# Patient Record
Sex: FEMALE | Race: WHITE | NOT HISPANIC OR LATINO
[De-identification: names, ages, dates, MRNs, and addresses within clinical notes are randomized per-mention and may not be internally consistent; named-entity substitution may affect disease eponyms.]

---

## 2018-02-14 ENCOUNTER — FORM ENCOUNTER (OUTPATIENT)
Age: 48
End: 2018-02-14

## 2018-02-14 ENCOUNTER — OTHER (OUTPATIENT)
Age: 48
End: 2018-02-14

## 2018-02-15 ENCOUNTER — APPOINTMENT (OUTPATIENT)
Dept: MAMMOGRAPHY | Facility: CLINIC | Age: 48
End: 2018-02-15
Payer: COMMERCIAL

## 2018-02-15 ENCOUNTER — APPOINTMENT (OUTPATIENT)
Dept: ULTRASOUND IMAGING | Facility: CLINIC | Age: 48
End: 2018-02-15
Payer: COMMERCIAL

## 2018-02-15 ENCOUNTER — OUTPATIENT (OUTPATIENT)
Dept: OUTPATIENT SERVICES | Facility: HOSPITAL | Age: 48
LOS: 1 days | End: 2018-02-15
Payer: COMMERCIAL

## 2018-02-15 DIAGNOSIS — Z01.419 ENCOUNTER FOR GYNECOLOGICAL EXAMINATION (GENERAL) (ROUTINE) WITHOUT ABNORMAL FINDINGS: ICD-10-CM

## 2018-02-15 DIAGNOSIS — Z00.8 ENCOUNTER FOR OTHER GENERAL EXAMINATION: ICD-10-CM

## 2018-02-15 PROCEDURE — 76641 ULTRASOUND BREAST COMPLETE: CPT

## 2018-02-15 PROCEDURE — 76641 ULTRASOUND BREAST COMPLETE: CPT | Mod: 26,50

## 2018-02-15 PROCEDURE — 77063 BREAST TOMOSYNTHESIS BI: CPT | Mod: 26

## 2018-02-15 PROCEDURE — 77067 SCR MAMMO BI INCL CAD: CPT | Mod: 26

## 2018-02-15 PROCEDURE — 77067 SCR MAMMO BI INCL CAD: CPT

## 2018-02-15 PROCEDURE — 77063 BREAST TOMOSYNTHESIS BI: CPT

## 2019-02-07 ENCOUNTER — TRANSCRIPTION ENCOUNTER (OUTPATIENT)
Age: 49
End: 2019-02-07

## 2020-11-15 ENCOUNTER — EMERGENCY (EMERGENCY)
Facility: HOSPITAL | Age: 50
LOS: 1 days | Discharge: HOME | End: 2020-11-15
Attending: EMERGENCY MEDICINE
Payer: COMMERCIAL

## 2020-11-15 VITALS
OXYGEN SATURATION: 94 % | HEART RATE: 106 BPM | SYSTOLIC BLOOD PRESSURE: 121 MMHG | DIASTOLIC BLOOD PRESSURE: 69 MMHG | RESPIRATION RATE: 20 BRPM | TEMPERATURE: 100 F

## 2020-11-15 VITALS — RESPIRATION RATE: 26 BRPM | HEART RATE: 114 BPM | HEIGHT: 66 IN | WEIGHT: 130.07 LBS | TEMPERATURE: 100 F

## 2020-11-15 LAB
ANION GAP SERPL CALC-SCNC: 14 MMOL/L — SIGNIFICANT CHANGE UP (ref 5–17)
APPEARANCE UR: ABNORMAL
BACTERIA # UR AUTO: ABNORMAL
BASOPHILS # BLD AUTO: 0.04 K/UL — SIGNIFICANT CHANGE UP (ref 0–0.2)
BASOPHILS NFR BLD AUTO: 0.3 % — SIGNIFICANT CHANGE UP (ref 0–2)
BILIRUB UR-MCNC: NEGATIVE — SIGNIFICANT CHANGE UP
BUN SERPL-MCNC: 20 MG/DL — SIGNIFICANT CHANGE UP (ref 8–20)
CALCIUM SERPL-MCNC: 8.8 MG/DL — SIGNIFICANT CHANGE UP (ref 8.6–10.2)
CHLORIDE SERPL-SCNC: 103 MMOL/L — SIGNIFICANT CHANGE UP (ref 98–107)
CO2 SERPL-SCNC: 24 MMOL/L — SIGNIFICANT CHANGE UP (ref 22–29)
COLOR SPEC: YELLOW — SIGNIFICANT CHANGE UP
CREAT SERPL-MCNC: 0.8 MG/DL — SIGNIFICANT CHANGE UP (ref 0.5–1.3)
DIFF PNL FLD: ABNORMAL
EOSINOPHIL # BLD AUTO: 0.22 K/UL — SIGNIFICANT CHANGE UP (ref 0–0.5)
EOSINOPHIL NFR BLD AUTO: 1.8 % — SIGNIFICANT CHANGE UP (ref 0–6)
EPI CELLS # UR: SIGNIFICANT CHANGE UP
GLUCOSE SERPL-MCNC: 100 MG/DL — HIGH (ref 70–99)
GLUCOSE UR QL: NEGATIVE — SIGNIFICANT CHANGE UP
HCG SERPL-ACNC: <4 MIU/ML — SIGNIFICANT CHANGE UP
HCT VFR BLD CALC: 38.5 % — SIGNIFICANT CHANGE UP (ref 34.5–45)
HGB BLD-MCNC: 13.1 G/DL — SIGNIFICANT CHANGE UP (ref 11.5–15.5)
IMM GRANULOCYTES NFR BLD AUTO: 0.2 % — SIGNIFICANT CHANGE UP (ref 0–1.5)
KETONES UR-MCNC: NEGATIVE — SIGNIFICANT CHANGE UP
LEUKOCYTE ESTERASE UR-ACNC: ABNORMAL
LYMPHOCYTES # BLD AUTO: 1.09 K/UL — SIGNIFICANT CHANGE UP (ref 1–3.3)
LYMPHOCYTES # BLD AUTO: 8.9 % — LOW (ref 13–44)
MCHC RBC-ENTMCNC: 32.3 PG — SIGNIFICANT CHANGE UP (ref 27–34)
MCHC RBC-ENTMCNC: 34 GM/DL — SIGNIFICANT CHANGE UP (ref 32–36)
MCV RBC AUTO: 94.8 FL — SIGNIFICANT CHANGE UP (ref 80–100)
MONOCYTES # BLD AUTO: 0.64 K/UL — SIGNIFICANT CHANGE UP (ref 0–0.9)
MONOCYTES NFR BLD AUTO: 5.2 % — SIGNIFICANT CHANGE UP (ref 2–14)
NEUTROPHILS # BLD AUTO: 10.27 K/UL — HIGH (ref 1.8–7.4)
NEUTROPHILS NFR BLD AUTO: 83.6 % — HIGH (ref 43–77)
NITRITE UR-MCNC: POSITIVE
PH UR: 6 — SIGNIFICANT CHANGE UP (ref 5–8)
PLATELET # BLD AUTO: 347 K/UL — SIGNIFICANT CHANGE UP (ref 150–400)
POTASSIUM SERPL-MCNC: 4.4 MMOL/L — SIGNIFICANT CHANGE UP (ref 3.5–5.3)
POTASSIUM SERPL-SCNC: 4.4 MMOL/L — SIGNIFICANT CHANGE UP (ref 3.5–5.3)
PROT UR-MCNC: 30 MG/DL
RBC # BLD: 4.06 M/UL — SIGNIFICANT CHANGE UP (ref 3.8–5.2)
RBC # FLD: 13.2 % — SIGNIFICANT CHANGE UP (ref 10.3–14.5)
RBC CASTS # UR COMP ASSIST: ABNORMAL /HPF (ref 0–4)
SODIUM SERPL-SCNC: 141 MMOL/L — SIGNIFICANT CHANGE UP (ref 135–145)
SP GR SPEC: 1.01 — SIGNIFICANT CHANGE UP (ref 1.01–1.02)
UROBILINOGEN FLD QL: NEGATIVE — SIGNIFICANT CHANGE UP
WBC # BLD: 12.29 K/UL — HIGH (ref 3.8–10.5)
WBC # FLD AUTO: 12.29 K/UL — HIGH (ref 3.8–10.5)
WBC UR QL: ABNORMAL

## 2020-11-15 PROCEDURE — 84702 CHORIONIC GONADOTROPIN TEST: CPT

## 2020-11-15 PROCEDURE — 99285 EMERGENCY DEPT VISIT HI MDM: CPT

## 2020-11-15 PROCEDURE — 85025 COMPLETE CBC W/AUTO DIFF WBC: CPT

## 2020-11-15 PROCEDURE — 87186 SC STD MICRODIL/AGAR DIL: CPT

## 2020-11-15 PROCEDURE — 81001 URINALYSIS AUTO W/SCOPE: CPT

## 2020-11-15 PROCEDURE — 96374 THER/PROPH/DIAG INJ IV PUSH: CPT

## 2020-11-15 PROCEDURE — 96376 TX/PRO/DX INJ SAME DRUG ADON: CPT

## 2020-11-15 PROCEDURE — 74176 CT ABD & PELVIS W/O CONTRAST: CPT

## 2020-11-15 PROCEDURE — 74176 CT ABD & PELVIS W/O CONTRAST: CPT | Mod: 26

## 2020-11-15 PROCEDURE — 87086 URINE CULTURE/COLONY COUNT: CPT

## 2020-11-15 PROCEDURE — 96375 TX/PRO/DX INJ NEW DRUG ADDON: CPT

## 2020-11-15 PROCEDURE — 80048 BASIC METABOLIC PNL TOTAL CA: CPT

## 2020-11-15 PROCEDURE — 36415 COLL VENOUS BLD VENIPUNCTURE: CPT

## 2020-11-15 PROCEDURE — 99284 EMERGENCY DEPT VISIT MOD MDM: CPT | Mod: 25

## 2020-11-15 RX ORDER — CEPHALEXIN 500 MG
2 CAPSULE ORAL
Qty: 40 | Refills: 0
Start: 2020-11-15 | End: 2020-11-24

## 2020-11-15 RX ORDER — ONDANSETRON 8 MG/1
1 TABLET, FILM COATED ORAL
Qty: 15 | Refills: 0
Start: 2020-11-15 | End: 2020-11-19

## 2020-11-15 RX ORDER — ONDANSETRON 8 MG/1
8 TABLET, FILM COATED ORAL ONCE
Refills: 0 | Status: COMPLETED | OUTPATIENT
Start: 2020-11-15 | End: 2020-11-15

## 2020-11-15 RX ORDER — KETOROLAC TROMETHAMINE 30 MG/ML
30 SYRINGE (ML) INJECTION ONCE
Refills: 0 | Status: DISCONTINUED | OUTPATIENT
Start: 2020-11-15 | End: 2020-11-15

## 2020-11-15 RX ORDER — HYDROMORPHONE HYDROCHLORIDE 2 MG/ML
0.5 INJECTION INTRAMUSCULAR; INTRAVENOUS; SUBCUTANEOUS ONCE
Refills: 0 | Status: DISCONTINUED | OUTPATIENT
Start: 2020-11-15 | End: 2020-11-15

## 2020-11-15 RX ORDER — HYDROMORPHONE HYDROCHLORIDE 2 MG/ML
1 INJECTION INTRAMUSCULAR; INTRAVENOUS; SUBCUTANEOUS ONCE
Refills: 0 | Status: DISCONTINUED | OUTPATIENT
Start: 2020-11-15 | End: 2020-11-15

## 2020-11-15 RX ORDER — CEPHALEXIN 500 MG
500 CAPSULE ORAL ONCE
Refills: 0 | Status: COMPLETED | OUTPATIENT
Start: 2020-11-15 | End: 2020-11-15

## 2020-11-15 RX ORDER — OXYCODONE AND ACETAMINOPHEN 5; 325 MG/1; MG/1
1 TABLET ORAL ONCE
Refills: 0 | Status: DISCONTINUED | OUTPATIENT
Start: 2020-11-15 | End: 2020-11-15

## 2020-11-15 RX ORDER — ONDANSETRON 8 MG/1
4 TABLET, FILM COATED ORAL ONCE
Refills: 0 | Status: COMPLETED | OUTPATIENT
Start: 2020-11-15 | End: 2020-11-15

## 2020-11-15 RX ADMIN — OXYCODONE AND ACETAMINOPHEN 1 TABLET(S): 5; 325 TABLET ORAL at 03:03

## 2020-11-15 RX ADMIN — ONDANSETRON 8 MILLIGRAM(S): 8 TABLET, FILM COATED ORAL at 01:11

## 2020-11-15 RX ADMIN — Medication 30 MILLIGRAM(S): at 01:11

## 2020-11-15 RX ADMIN — HYDROMORPHONE HYDROCHLORIDE 1 MILLIGRAM(S): 2 INJECTION INTRAMUSCULAR; INTRAVENOUS; SUBCUTANEOUS at 01:11

## 2020-11-15 RX ADMIN — HYDROMORPHONE HYDROCHLORIDE 0.5 MILLIGRAM(S): 2 INJECTION INTRAMUSCULAR; INTRAVENOUS; SUBCUTANEOUS at 03:03

## 2020-11-15 RX ADMIN — Medication 500 MILLIGRAM(S): at 03:03

## 2020-11-15 RX ADMIN — ONDANSETRON 4 MILLIGRAM(S): 8 TABLET, FILM COATED ORAL at 03:03

## 2020-11-15 NOTE — ED PROVIDER NOTE - OBJECTIVE STATEMENT
51 y/o female presents to ED c/o flank pain. Patient reports sudden onset of right flank pain, started at 8pm last night. Patient was unable to sleep, so she presented to the ED.    denies fever. denies HA or neck pain. no chest pain or sob. no abd pain. no n/v/d. no urinary f/u/d. no back pain. no motor or sensory deficits. denies illicit drug use. no recent travel. no rash. no other acute issues symptoms or concerns

## 2020-11-15 NOTE — ED PROVIDER NOTE - PATIENT PORTAL LINK FT
You can access the FollowMyHealth Patient Portal offered by [facility] by registering at the following website: [email or web address]. By joining [facility]’s FollowMyHealth portal, you will also be able to view your health information using other applications (apps) compatible with our system.

## 2020-11-15 NOTE — ED ADULT TRIAGE NOTE - CHIEF COMPLAINT QUOTE
patient c/o sudden onset right flank pain that began at 8PM tonight. patient attempted to go to sleep with heating pad but pain got worse which brought her for eval. patient met by Dr. Hughes in waiting room and brought straight to critical care. unable to obtain BP in triage, patient agitated, crying.

## 2020-11-15 NOTE — ED ADULT NURSE REASSESSMENT NOTE - NS ED NURSE REASSESS COMMENT FT1
discharge instructions provided by MD Hughes. Vital Signs Stable. IV removed. pt d/c in stable condition, no apparent distress noted at this time. pt A&Ox3. pt able to ambulate with steady gait. pt in no distress at d/c.

## 2020-11-15 NOTE — ED PROVIDER NOTE - CLINICAL SUMMARY MEDICAL DECISION MAKING FREE TEXT BOX
feeling better likely passed stone abx pain meds family of docotor they understand return precautions and agree to plan of care

## 2020-11-15 NOTE — ED PROVIDER NOTE - CARE PLAN
Principal Discharge DX:	Renal colic on right side  Secondary Diagnosis:	UTI (urinary tract infection)

## 2020-11-15 NOTE — ED PROVIDER NOTE - CARE PROVIDER_API CALL
Paul Mcgregor  UROLOGY  [address] [place] [address]  Phone: [phone]  Fax: [phone]  Follow Up Time:

## 2020-12-15 ENCOUNTER — APPOINTMENT (OUTPATIENT)
Dept: UROLOGY | Facility: CLINIC | Age: 50
End: 2020-12-15
Payer: COMMERCIAL

## 2020-12-15 VITALS
BODY MASS INDEX: 24.84 KG/M2 | RESPIRATION RATE: 17 BRPM | TEMPERATURE: 98 F | WEIGHT: 135 LBS | SYSTOLIC BLOOD PRESSURE: 147 MMHG | HEIGHT: 62 IN | HEART RATE: 56 BPM | DIASTOLIC BLOOD PRESSURE: 101 MMHG

## 2020-12-15 PROCEDURE — 99204 OFFICE O/P NEW MOD 45 MIN: CPT

## 2020-12-15 PROCEDURE — 99072 ADDL SUPL MATRL&STAF TM PHE: CPT

## 2020-12-15 NOTE — PHYSICAL EXAM
[General Appearance - Well Developed] : well developed [General Appearance - Well Nourished] : well nourished [Abdomen Soft] : soft [Abdomen Tenderness] : non-tender [Skin Color & Pigmentation] : normal skin color and pigmentation [Heart Rate And Rhythm] : Heart rate and rhythm were normal [] : no respiratory distress [Oriented To Time, Place, And Person] : oriented to person, place, and time [Normal Station and Gait] : the gait and station were normal for the patient's age [No Focal Deficits] : no focal deficits [No Palpable Adenopathy] : no palpable adenopathy [FreeTextEntry1] : s

## 2020-12-15 NOTE — ASSESSMENT
[FreeTextEntry1] : 50F previously seen in 2015 for R UPJ obstruction here for follow up.\par -- CT reviewed - attenuated parenchyma, severely dilated R collecting system\par -- will finish course of Keflex, Macrobid ppx until sx\par -- recommend R simple nephrectomy\par

## 2020-12-15 NOTE — HISTORY OF PRESENT ILLNESS
[FreeTextEntry1] : 50F previously seen in 2015 for R UPJ obstruction here for follow up. At that point, pt was asymptomatic, 18% function at that time. Pt was seen several weeks prior at ED for severe R sided flank pain, fevers and was diagnosed w/pyelonephritis and was discharged w/abx. CT at that visit revealed  Pt had recurrent symptoms, w/associated fevers remains on Keflex. Cr 11/27/2020 0.94. UCx growing E. coli \par \par PMH: HTN\par PSH: umbilical hernia repair w/mesh\par Med: Benicar\par Allergies: sulfa

## 2020-12-22 ENCOUNTER — OUTPATIENT (OUTPATIENT)
Dept: OUTPATIENT SERVICES | Facility: HOSPITAL | Age: 50
LOS: 1 days | End: 2020-12-22
Payer: COMMERCIAL

## 2020-12-22 VITALS
SYSTOLIC BLOOD PRESSURE: 146 MMHG | TEMPERATURE: 98 F | RESPIRATION RATE: 16 BRPM | DIASTOLIC BLOOD PRESSURE: 106 MMHG | HEART RATE: 68 BPM | HEIGHT: 62.75 IN | OXYGEN SATURATION: 99 % | WEIGHT: 136.91 LBS

## 2020-12-22 DIAGNOSIS — Z87.19 PERSONAL HISTORY OF OTHER DISEASES OF THE DIGESTIVE SYSTEM: Chronic | ICD-10-CM

## 2020-12-22 DIAGNOSIS — Q62.39 OTHER OBSTRUCTIVE DEFECTS OF RENAL PELVIS AND URETER: ICD-10-CM

## 2020-12-22 DIAGNOSIS — N13.5 CROSSING VESSEL AND STRICTURE OF URETER WITHOUT HYDRONEPHROSIS: ICD-10-CM

## 2020-12-22 DIAGNOSIS — Z90.89 ACQUIRED ABSENCE OF OTHER ORGANS: Chronic | ICD-10-CM

## 2020-12-22 LAB
BLD GP AB SCN SERPL QL: NEGATIVE — SIGNIFICANT CHANGE UP
HCG UR QL: NEGATIVE — SIGNIFICANT CHANGE UP
HCT VFR BLD CALC: 39.8 % — SIGNIFICANT CHANGE UP (ref 34.5–45)
HGB BLD-MCNC: 13.3 G/DL — SIGNIFICANT CHANGE UP (ref 11.5–15.5)
MCHC RBC-ENTMCNC: 30.9 PG — SIGNIFICANT CHANGE UP (ref 27–34)
MCHC RBC-ENTMCNC: 33.4 GM/DL — SIGNIFICANT CHANGE UP (ref 32–36)
MCV RBC AUTO: 92.6 FL — SIGNIFICANT CHANGE UP (ref 80–100)
NRBC # BLD: 0 /100 WBCS — SIGNIFICANT CHANGE UP
NRBC # FLD: 0 K/UL — SIGNIFICANT CHANGE UP
PLATELET # BLD AUTO: 269 K/UL — SIGNIFICANT CHANGE UP (ref 150–400)
RBC # BLD: 4.3 M/UL — SIGNIFICANT CHANGE UP (ref 3.8–5.2)
RBC # FLD: 13.8 % — SIGNIFICANT CHANGE UP (ref 10.3–14.5)
RH IG SCN BLD-IMP: POSITIVE — SIGNIFICANT CHANGE UP
WBC # BLD: 4.48 K/UL — SIGNIFICANT CHANGE UP (ref 3.8–10.5)
WBC # FLD AUTO: 4.48 K/UL — SIGNIFICANT CHANGE UP (ref 3.8–10.5)

## 2020-12-22 PROCEDURE — 93010 ELECTROCARDIOGRAM REPORT: CPT

## 2020-12-22 NOTE — H&P PST ADULT - NSICDXPROBLEM_GEN_ALL_CORE_FT
PROBLEM DIAGNOSES  Problem: Ureteropelvic junction (UPJ) obstruction  Assessment and Plan: Pt. is scheduled for a right laparoscopic nephrectomy 1/4/21.  Pt. verbalized understanding of instructions and that Chlorhexidine is for external use.  Pt. is scheduled for COVID test.  Repeat BP was 140/96.  Pt. is asymptomatic.  Pt. is anxious and stressed.  She cried twice during PST visit.  Contacted pt's. PMD and discussed the BP and anxiety.  Sending pt. to PMD today, 12/22/20 to have anti hypertensive medication regimen evaluated for increase or possibly and additional medication prior to surgery.  Pt. was previously scheduled for medical clearance on 12/30/20 as requested by the Surgeon.

## 2020-12-22 NOTE — H&P PST ADULT - NSICDXFAMILYHX_GEN_ALL_CORE_FT
FAMILY HISTORY:  Family history of von Willebrand disease, daughter  FH: HTN (hypertension), father

## 2020-12-22 NOTE — H&P PST ADULT - NSICDXPASTSURGICALHX_GEN_ALL_CORE_FT
PAST SURGICAL HISTORY:  History of abdominal hernia repair and tubal ligation-4/2009    History of tonsillectomy 1976

## 2020-12-22 NOTE — H&P PST ADULT - NSICDXPASTMEDICALHX_GEN_ALL_CORE_FT
PAST MEDICAL HISTORY:  HTN (hypertension)     Kidney infection     UPJ (ureteropelvic junction) obstruction      PAST MEDICAL HISTORY:  HTN (hypertension)     Kidney infection     Scoliosis     UPJ (ureteropelvic junction) obstruction

## 2020-12-22 NOTE — H&P PST ADULT - VENOUS THROMBOEMBOLISM CURRENT STATUS
Patient presenting with diarrhea, C. diff negative.  Likely overflow diarrhea secondary to underlying stercoral colitis.  Manual disimpaction attempted.  Agree with antibiotics for colitis to prevent bacterial translocation into blood stream.    Recommendations:  - Mineral oil enemas BID  - MiraLAX BID  - Senna 2 tabs QHS  - lactulose 30 BID  - Continue antibiotics  - No indication for endoscopic evaluation at this time    Thank you for the consult.  Please do not hesitate to call with any questions or concerns.    SAEED Aldrich MD  [facility]  Division of Gastroenterology  Tel [phone]  Fax [phone]  01-30-20 @ 13:45 (0) indicator not present

## 2020-12-22 NOTE — H&P PST ADULT - HISTORY OF PRESENT ILLNESS
Pt. is a 49 yo female who states in 5 years ago her right kidney was functioning at 18 %.  Recently, on 11/14/20 pt. had terrible pain and went to the ER.  Pt. was diagnosed with a kidney infection.  Pt. was started on Keflex 11/14 2000mg daily and currently is on a lower dosage of Keflex that will be taken "until surgery." Pt. is a 49 yo female who states in 5 years ago her right kidney was functioning at 18 %.  Recently, on 11/14/20 pt. had terrible right flank pain with vomiting.  Pt. went to the ER.  Pt. was diagnosed with a kidney infection.  Pt. was started on Keflex 11/14 2000mg daily and currently is on a lower dosage of Keflex that will be taken "until surgery."

## 2020-12-23 LAB
CULTURE RESULTS: SIGNIFICANT CHANGE UP
SPECIMEN SOURCE: SIGNIFICANT CHANGE UP

## 2020-12-31 DIAGNOSIS — Z01.818 ENCOUNTER FOR OTHER PREPROCEDURAL EXAMINATION: ICD-10-CM

## 2021-01-01 ENCOUNTER — APPOINTMENT (OUTPATIENT)
Dept: DISASTER EMERGENCY | Facility: CLINIC | Age: 51
End: 2021-01-01

## 2021-01-02 LAB — SARS-COV-2 N GENE NPH QL NAA+PROBE: NOT DETECTED

## 2021-01-03 ENCOUNTER — TRANSCRIPTION ENCOUNTER (OUTPATIENT)
Age: 51
End: 2021-01-03

## 2021-01-04 ENCOUNTER — INPATIENT (INPATIENT)
Facility: HOSPITAL | Age: 51
LOS: 0 days | Discharge: HOME | End: 2021-01-05
Attending: UROLOGY | Admitting: UROLOGY
Payer: COMMERCIAL

## 2021-01-04 ENCOUNTER — APPOINTMENT (OUTPATIENT)
Dept: UROLOGY | Facility: HOSPITAL | Age: 51
End: 2021-01-04

## 2021-01-04 ENCOUNTER — RESULT REVIEW (OUTPATIENT)
Age: 51
End: 2021-01-04

## 2021-01-04 VITALS
HEART RATE: 68 BPM | SYSTOLIC BLOOD PRESSURE: 146 MMHG | WEIGHT: 136.91 LBS | OXYGEN SATURATION: 99 % | DIASTOLIC BLOOD PRESSURE: 106 MMHG | HEIGHT: 62.75 IN | TEMPERATURE: 98 F | RESPIRATION RATE: 16 BRPM

## 2021-01-04 DIAGNOSIS — I10 ESSENTIAL (PRIMARY) HYPERTENSION: ICD-10-CM

## 2021-01-04 DIAGNOSIS — Z90.89 ACQUIRED ABSENCE OF OTHER ORGANS: Chronic | ICD-10-CM

## 2021-01-04 DIAGNOSIS — Q62.39 OTHER OBSTRUCTIVE DEFECTS OF RENAL PELVIS AND URETER: ICD-10-CM

## 2021-01-04 DIAGNOSIS — M41.9 SCOLIOSIS, UNSPECIFIED: ICD-10-CM

## 2021-01-04 DIAGNOSIS — Z87.19 PERSONAL HISTORY OF OTHER DISEASES OF THE DIGESTIVE SYSTEM: Chronic | ICD-10-CM

## 2021-01-04 DIAGNOSIS — N13.5 CROSSING VESSEL AND STRICTURE OF URETER WITHOUT HYDRONEPHROSIS: ICD-10-CM

## 2021-01-04 DIAGNOSIS — N15.9 RENAL TUBULO-INTERSTITIAL DISEASE, UNSPECIFIED: ICD-10-CM

## 2021-01-04 LAB — HCG UR QL: NEGATIVE — SIGNIFICANT CHANGE UP

## 2021-01-04 PROCEDURE — 88307 TISSUE EXAM BY PATHOLOGIST: CPT | Mod: 26

## 2021-01-04 RX ORDER — OLMESARTAN MEDOXOMIL 5 MG/1
1 TABLET, FILM COATED ORAL
Qty: 0 | Refills: 0 | DISCHARGE

## 2021-01-04 RX ORDER — LOSARTAN POTASSIUM 100 MG/1
50 TABLET, FILM COATED ORAL DAILY
Refills: 0 | Status: DISCONTINUED | OUTPATIENT
Start: 2021-01-05 | End: 2021-01-05

## 2021-01-04 RX ORDER — OXYCODONE HYDROCHLORIDE 5 MG/1
10 TABLET ORAL EVERY 4 HOURS
Refills: 0 | Status: DISCONTINUED | OUTPATIENT
Start: 2021-01-04 | End: 2021-01-05

## 2021-01-04 RX ORDER — SODIUM CHLORIDE 9 MG/ML
1000 INJECTION, SOLUTION INTRAVENOUS
Refills: 0 | Status: DISCONTINUED | OUTPATIENT
Start: 2021-01-04 | End: 2021-01-05

## 2021-01-04 RX ORDER — OXYCODONE HYDROCHLORIDE 5 MG/1
5 TABLET ORAL EVERY 4 HOURS
Refills: 0 | Status: DISCONTINUED | OUTPATIENT
Start: 2021-01-04 | End: 2021-01-05

## 2021-01-04 RX ORDER — LOSARTAN POTASSIUM 100 MG/1
50 TABLET, FILM COATED ORAL DAILY
Refills: 0 | Status: DISCONTINUED | OUTPATIENT
Start: 2021-01-04 | End: 2021-01-04

## 2021-01-04 RX ORDER — ACETAMINOPHEN 500 MG
1000 TABLET ORAL EVERY 6 HOURS
Refills: 0 | Status: COMPLETED | OUTPATIENT
Start: 2021-01-04 | End: 2021-01-05

## 2021-01-04 RX ORDER — HEPARIN SODIUM 5000 [USP'U]/ML
5000 INJECTION INTRAVENOUS; SUBCUTANEOUS EVERY 8 HOURS
Refills: 0 | Status: DISCONTINUED | OUTPATIENT
Start: 2021-01-04 | End: 2021-01-05

## 2021-01-04 RX ORDER — SODIUM CHLORIDE 9 MG/ML
1000 INJECTION INTRAMUSCULAR; INTRAVENOUS; SUBCUTANEOUS
Refills: 0 | Status: DISCONTINUED | OUTPATIENT
Start: 2021-01-04 | End: 2021-01-04

## 2021-01-04 RX ORDER — HYDROMORPHONE HYDROCHLORIDE 2 MG/ML
0.5 INJECTION INTRAMUSCULAR; INTRAVENOUS; SUBCUTANEOUS
Refills: 0 | Status: DISCONTINUED | OUTPATIENT
Start: 2021-01-04 | End: 2021-01-05

## 2021-01-04 RX ADMIN — HEPARIN SODIUM 5000 UNIT(S): 5000 INJECTION INTRAVENOUS; SUBCUTANEOUS at 21:38

## 2021-01-04 RX ADMIN — HEPARIN SODIUM 5000 UNIT(S): 5000 INJECTION INTRAVENOUS; SUBCUTANEOUS at 14:10

## 2021-01-04 RX ADMIN — Medication 400 MILLIGRAM(S): at 14:10

## 2021-01-04 RX ADMIN — SODIUM CHLORIDE 125 MILLILITER(S): 9 INJECTION, SOLUTION INTRAVENOUS at 12:00

## 2021-01-04 RX ADMIN — SODIUM CHLORIDE 125 MILLILITER(S): 9 INJECTION, SOLUTION INTRAVENOUS at 09:34

## 2021-01-04 RX ADMIN — Medication 400 MILLIGRAM(S): at 20:04

## 2021-01-04 RX ADMIN — SODIUM CHLORIDE 125 MILLILITER(S): 9 INJECTION, SOLUTION INTRAVENOUS at 12:58

## 2021-01-04 NOTE — ASU PREOP CHECKLIST - SITE MARKED BY SURGEON
"yes

## 2021-01-04 NOTE — CONSULT NOTE ADULT - PROBLEM SELECTOR RECOMMENDATION 9
c/w home meds - Benicar 20 (substitute formulary c/w home meds - Benicar 20 (substitute formulary losartan 50)  amlodipine 5mg qd was just started last week after preop appointment   took both meds this am, c/w ACEI tomorrow am, hold amlodipine for now and f/u bp

## 2021-01-04 NOTE — CONSULT NOTE ADULT - ASSESSMENT
50F HTN, scoliosis, chronic R UPJ obstruction (dx 2015, functioning at 18%).  Recently on 11/14/20 developed severe right flank pain with vomiting, went to the ER, diagnosed with a kidney infection. Treated with Keflex 11/14 2000mg daily and currently is on a lower dosage of Keflex for prophylaxis. Now 1/4/21 s/p lap R simple nephrectomy.

## 2021-01-04 NOTE — CONSULT NOTE ADULT - SUBJECTIVE AND OBJECTIVE BOX
Patient is a 50y old  Female who presents with a chief complaint of UPJ obstruction     HPI:  50F HTN, scoliosis, chronic R UPJ obstruction (dx 2015, functioning at 18 %).  Recentl, on 11/14/20 developed severe right flank pain with vomiting, went to the ER, diagnosed with a kidney infection. Treated with Keflex 11/14 2000mg daily and currently is on a lower dosage of Keflex for prophylaxis.    Now 1/4/21 s/p lap R simple nephrectomy.  Doing well postop.  No significant abdominal pain at this time.  Mild discomfort in throat from tube.  No chest pain, SOB, nausea, vomiting.    Allergies: sulfa drugs (Other)    HOME MEDICATIONS:   benicar 20qd  amlodipine 5qd (started last week)  Keflex (since November)    MEDICATIONS  (STANDING):  acetaminophen  IVPB .. 1000 milliGRAM(s) IV Intermittent every 6 hours  heparin   Injectable 5000 Unit(s) SubCutaneous every 8 hours  lactated ringers. 1000 milliLiter(s) (125 mL/Hr) IV Continuous <Continuous>  losartan 50 milliGRAM(s) Oral daily    MEDICATIONS  (PRN):  oxyCODONE    IR 5 milliGRAM(s) Oral every 4 hours PRN Moderate Pain (4 - 6)  oxyCODONE    IR 10 milliGRAM(s) Oral every 4 hours PRN Severe Pain (7 - 10)    PAST MEDICAL & SURGICAL HISTORY:  Scoliosis  UPJ (ureteropelvic junction) obstruction  HTN (hypertension)  Kidney infection  History of tonsillectomy 1976  History of abdominal hernia repair and tubal ligation-4/2009    SOCIAL HISTORY:  , 3 children   PT;  no tobacco; alcohol 2-3x/week    FAMILY HISTORY:  Family history of von Willebrand disease - daughter  HTN (hypertension) - father    REVIEW OF SYSTEMS:  CONSTITUTIONAL: No fever, weight loss, or fatigue  EYES: No eye pain, visual disturbances, or discharge  ENMT:  No difficulty hearing, tinnitus, vertigo; No sinus or throat pain  NECK: No pain or stiffness  BREASTS: No pain, masses, or nipple discharge  RESPIRATORY: No cough, wheezing, chills or hemoptysis; No shortness of breath  CARDIOVASCULAR: No chest pain, palpitations, dizziness, or leg swelling  GASTROINTESTINAL: No abdominal or epigastric pain. No nausea, vomiting, or hematemesis; No diarrhea or constipation. No melena or hematochezia.  GENITOURINARY: per HPI  NEUROLOGICAL: No headaches, memory loss, loss of strength, numbness, or tremors  SKIN: No itching, burning, rashes, or lesions   LYMPH NODES: No enlarged glands  ENDOCRINE: No heat or cold intolerance; No hair loss  MUSCULOSKELETAL: No muscle or back pain  PSYCHIATRIC: No depression, anxiety, mood swings, or difficulty sleeping  HEME/LYMPH: No easy bruising, or bleeding gums  ALLERGY AND IMMUNOLOGIC: No hives or eczema  [ x ] All other ROS negative  [  ] Unable to obtain due to poor mental status    Vital Signs Last 24 Hrs  T(C): 36.3 (04 Jan 2021 09:20), Max: 36.5 (04 Jan 2021 05:51)  T(F): 97.3 (04 Jan 2021 09:20), Max: 97.7 (04 Jan 2021 05:51)  HR: 83 (04 Jan 2021 10:45) (68 - 87)  BP: 119/80 (04 Jan 2021 10:45) (94/55 - 146/106)  BP(mean): 89 (04 Jan 2021 10:45) (64 - 89)  RR: 19 (04 Jan 2021 10:45) (15 - 21)  SpO2: 96% (04 Jan 2021 10:45) (96% - 100%)    PHYSICAL EXAM:  GENERAL: NAD, well-groomed, well-developed  HEAD:  Atraumatic, Normocephalic  EYES: EOMI, PERRLA, conjunctiva and sclera clear  ENMT: Moist mucous membranes  NECK: Supple, No JVD  RESPIRATORY: Clear to auscultation bilaterally; No rales, rhonchi, wheezing, or rubs  CARDIOVASCULAR: Regular rate and rhythm; No murmurs, rubs, or gallops  GASTROINTESTINAL: Soft, ND, incisions c/d/i  GENITOURINARY: +johns  EXTREMITIES:  2+ Peripheral Pulses, No clubbing, cyanosis, or edema  NERVOUS SYSTEM:  Alert & Oriented X3; Moving all 4 extremities; No gross sensory deficits  HEME/LYMPH: No lymphadenopathy noted  SKIN: No rashes or lesions  PSYCH: calm, appropriate      LABS:  Basic Metabolic Panel (11.15.20 @ 01:32)    Sodium, Serum: 141 mmol/L    Potassium, Serum: 4.4 mmol/L    Chloride, Serum: 103 mmol/L    Carbon Dioxide, Serum: 24.0 mmol/L    Anion Gap, Serum: 14 mmol/L    Blood Urea Nitrogen, Serum: 20.0 mg/dL    Creatinine, Serum: 0.80 mg/dL    Glucose, Serum: 100 mg/dL    Calcium, Total Serum: 8.8 mg/dL    eGFR if Non : 86:     Complete Blood Count (12.22.20 @ 13:43)    Nucleated RBC: 0 /100 WBCs    WBC Count: 4.48 K/uL    RBC Count: 4.30 M/uL    Hemoglobin: 13.3 g/dL    Hematocrit: 39.8 %    Mean Cell Volume: 92.6 fL    Mean Cell Hemoglobin: 30.9 pg    Mean Cell Hemoglobin Conc: 33.4 gm/dL    Red Cell Distrib Width: 13.8 %    Platelet Count - Automated: 269 K/uL    Nucleated RBC #: 0.00 K/uL    Culture - Urine (11.15.20 @ 08:47)    -  Amikacin: S <=16    -  Amoxicillin/Clavulanic Acid: S <=8/4    -  Ampicillin: S <=8 These ampicillin results predict results for amoxicillin    -  Ampicillin/Sulbactam: S <=4/2 Enterobacter, Citrobacter, and Serratia may develop resistance during prolonged therapy (3-4 days)    -  Aztreonam: S <=4    -  Cefazolin: S <=2 (MIC_CL_COM_ENTERIC_CEFAZU) For uncomplicated UTI with K. pneumoniae, E. coli, or P. mirablis: JOSIAS <=16 is sensitive and JOSIAS >=32 is resistant. This also predicts results for oral agents cefaclor, cefdinir, cefpodoxime, cefprozil, cefuroxime axetil, cephalexin and locarbef for uncomplicated UTI. Note that some isolates may be susceptible to these agents while testing resistant to cefazolin.    -  Cefepime: S <=2    -  Cefoxitin: S <=8    -  Ceftriaxone: S <=1 Enterobacter, Citrobacter, and Serratia may develop resistance during prolonged therapy    -  Ciprofloxacin: S <=0.25    -  Ertapenem: S <=0.5    -  Gentamicin: S <=2    -  Imipenem: S <=1    -  Levofloxacin: S <=0.5    -  Meropenem: S <=1    -  Nitrofurantoin: S <=32 Should not be used to treat pyelonephritis    -  Piperacillin/Tazobactam: S <=8    -  Tigecycline: S <=2    -  Tobramycin: S <=2    -  Trimethoprim/Sulfamethoxazole: S <=0.5/9.5    Specimen Source: .Urine Clean Catch (Midstream)    Culture Results:   >100,000 CFU/ml Escherichia coli    Organism Identification: Escherichia coli    Organism: Escherichia coli    Method Type: [facility]    Culture - Urine (12.22.20 @ 18:05)    Specimen Source: .Urine Clean Catch (Midstream)    Culture Results:   <10,000 CFU/mL Normal Urogenital Deidre    RADIOLOGY & ADDITIONAL STUDIES:    EKG:   Personally Reviewed:  [ ] YES     Imaging:   Personally Reviewed:  [ ] YES               Consultant(s) notes reviewed:    Care Discussed with Consultant(s)/Other Providers: urology re overall care   PMD Dr. Rodriguez [phone]    Patient is a 50y old  Female who presents with a chief complaint of UPJ obstruction     HPI:  50F HTN, scoliosis, chronic R UPJ obstruction (dx 2015, functioning at 18%).  Recently on 11/14/20 developed severe right flank pain with vomiting, went to the ER, diagnosed with a kidney infection. Treated with Keflex 11/14 2000mg daily and currently is on a lower dosage of Keflex for prophylaxis.    Now 1/4/21 s/p lap R simple nephrectomy.  Doing well postop.  No significant abdominal pain at this time.  Mild discomfort in throat from tube.  No chest pain, SOB, nausea, vomiting.    Allergies: sulfa drugs (Other)    HOME MEDICATIONS:   benicar 20qd  amlodipine 5qd (started last week)  Keflex (since November)    MEDICATIONS  (STANDING):  acetaminophen  IVPB .. 1000 milliGRAM(s) IV Intermittent every 6 hours  heparin   Injectable 5000 Unit(s) SubCutaneous every 8 hours  lactated ringers. 1000 milliLiter(s) (125 mL/Hr) IV Continuous <Continuous>  losartan 50 milliGRAM(s) Oral daily    MEDICATIONS  (PRN):  oxyCODONE    IR 5 milliGRAM(s) Oral every 4 hours PRN Moderate Pain (4 - 6)  oxyCODONE    IR 10 milliGRAM(s) Oral every 4 hours PRN Severe Pain (7 - 10)    PAST MEDICAL & SURGICAL HISTORY:  Scoliosis  UPJ (ureteropelvic junction) obstruction  HTN (hypertension)  Kidney infection  History of tonsillectomy 1976  History of abdominal hernia repair and tubal ligation-4/2009    SOCIAL HISTORY:  , 3 children   PT;  no tobacco; alcohol 2-3x/week    FAMILY HISTORY:  Family history of von Willebrand disease - daughter  HTN (hypertension) - father    REVIEW OF SYSTEMS:  CONSTITUTIONAL: No fever, weight loss, or fatigue  EYES: No eye pain, visual disturbances, or discharge  ENMT:  No difficulty hearing, tinnitus, vertigo; No sinus or throat pain  NECK: No pain or stiffness  BREASTS: No pain, masses, or nipple discharge  RESPIRATORY: No cough, wheezing, chills or hemoptysis; No shortness of breath  CARDIOVASCULAR: No chest pain, palpitations, dizziness, or leg swelling  GASTROINTESTINAL: No abdominal or epigastric pain. No nausea, vomiting, or hematemesis; No diarrhea or constipation. No melena or hematochezia.  GENITOURINARY: per HPI  NEUROLOGICAL: No headaches, memory loss, loss of strength, numbness, or tremors  SKIN: No itching, burning, rashes, or lesions   LYMPH NODES: No enlarged glands  ENDOCRINE: No heat or cold intolerance; No hair loss  MUSCULOSKELETAL: No muscle or back pain  PSYCHIATRIC: No depression, anxiety, mood swings, or difficulty sleeping  HEME/LYMPH: No easy bruising, or bleeding gums  ALLERGY AND IMMUNOLOGIC: No hives or eczema  [ x ] All other ROS negative  [  ] Unable to obtain due to poor mental status    Vital Signs Last 24 Hrs  T(C): 36.3 (04 Jan 2021 09:20), Max: 36.5 (04 Jan 2021 05:51)  T(F): 97.3 (04 Jan 2021 09:20), Max: 97.7 (04 Jan 2021 05:51)  HR: 83 (04 Jan 2021 10:45) (68 - 87)  BP: 119/80 (04 Jan 2021 10:45) (94/55 - 146/106)  BP(mean): 89 (04 Jan 2021 10:45) (64 - 89)  RR: 19 (04 Jan 2021 10:45) (15 - 21)  SpO2: 96% (04 Jan 2021 10:45) (96% - 100%)    PHYSICAL EXAM:  GENERAL: NAD, well-groomed, well-developed  HEAD:  Atraumatic, Normocephalic  EYES: EOMI, PERRLA, conjunctiva and sclera clear  ENMT: Moist mucous membranes  NECK: Supple, No JVD  RESPIRATORY: Clear to auscultation bilaterally; No rales, rhonchi, wheezing, or rubs  CARDIOVASCULAR: Regular rate and rhythm; No murmurs, rubs, or gallops  GASTROINTESTINAL: Soft, ND, incisions c/d/i  GENITOURINARY: +johns  EXTREMITIES:  2+ Peripheral Pulses, No clubbing, cyanosis, or edema  NERVOUS SYSTEM:  Alert & Oriented X3; Moving all 4 extremities; No gross sensory deficits  HEME/LYMPH: No lymphadenopathy noted  SKIN: No rashes or lesions  PSYCH: calm, appropriate      LABS:  Basic Metabolic Panel (11.15.20 @ 01:32)    Sodium, Serum: 141 mmol/L    Potassium, Serum: 4.4 mmol/L    Chloride, Serum: 103 mmol/L    Carbon Dioxide, Serum: 24.0 mmol/L    Anion Gap, Serum: 14 mmol/L    Blood Urea Nitrogen, Serum: 20.0 mg/dL    Creatinine, Serum: 0.80 mg/dL    Glucose, Serum: 100 mg/dL    Calcium, Total Serum: 8.8 mg/dL    eGFR if Non : 86:     Complete Blood Count (12.22.20 @ 13:43)    Nucleated RBC: 0 /100 WBCs    WBC Count: 4.48 K/uL    RBC Count: 4.30 M/uL    Hemoglobin: 13.3 g/dL    Hematocrit: 39.8 %    Mean Cell Volume: 92.6 fL    Mean Cell Hemoglobin: 30.9 pg    Mean Cell Hemoglobin Conc: 33.4 gm/dL    Red Cell Distrib Width: 13.8 %    Platelet Count - Automated: 269 K/uL    Nucleated RBC #: 0.00 K/uL    Culture - Urine (11.15.20 @ 08:47)    -  Amikacin: S <=16    -  Amoxicillin/Clavulanic Acid: S <=8/4    -  Ampicillin: S <=8 These ampicillin results predict results for amoxicillin    -  Ampicillin/Sulbactam: S <=4/2 Enterobacter, Citrobacter, and Serratia may develop resistance during prolonged therapy (3-4 days)    -  Aztreonam: S <=4    -  Cefazolin: S <=2 (MIC_CL_COM_ENTERIC_CEFAZU) For uncomplicated UTI with K. pneumoniae, E. coli, or P. mirablis: JOSIAS <=16 is sensitive and JOSIAS >=32 is resistant. This also predicts results for oral agents cefaclor, cefdinir, cefpodoxime, cefprozil, cefuroxime axetil, cephalexin and locarbef for uncomplicated UTI. Note that some isolates may be susceptible to these agents while testing resistant to cefazolin.    -  Cefepime: S <=2    -  Cefoxitin: S <=8    -  Ceftriaxone: S <=1 Enterobacter, Citrobacter, and Serratia may develop resistance during prolonged therapy    -  Ciprofloxacin: S <=0.25    -  Ertapenem: S <=0.5    -  Gentamicin: S <=2    -  Imipenem: S <=1    -  Levofloxacin: S <=0.5    -  Meropenem: S <=1    -  Nitrofurantoin: S <=32 Should not be used to treat pyelonephritis    -  Piperacillin/Tazobactam: S <=8    -  Tigecycline: S <=2    -  Tobramycin: S <=2    -  Trimethoprim/Sulfamethoxazole: S <=0.5/9.5    Specimen Source: .Urine Clean Catch (Midstream)    Culture Results:   >100,000 CFU/ml Escherichia coli    Organism Identification: Escherichia coli    Organism: Escherichia coli    Method Type: JOSIAS    Culture - Urine (12.22.20 @ 18:05)    Specimen Source: .Urine Clean Catch (Midstream)    Culture Results:   <10,000 CFU/mL Normal Urogenital Deidre    RADIOLOGY & ADDITIONAL STUDIES:    EKG:   Personally Reviewed:  [ ] YES     Imaging:   Personally Reviewed:  [ ] YES               Consultant(s) notes reviewed:    Care Discussed with Consultant(s)/Other Providers: urology re overall care

## 2021-01-04 NOTE — ASU PREOP CHECKLIST - PATIENT SENT TO
PATIENT NAME: LUZ WILKERSON   MEDICAL RECORD NUMBER: [id number]   ACCOUNT NUMBER: [id number]   ADMIT DATE: 2017   DISCHARGE DATE:   ATTENDING PHYSICIAN: HONEY CULVER M.D. OB/GYN   ROOM #: 0319-01   SERVICE:                              PREOPERATIVE HISTORY AND PHYSICAL         Preoperative H and P for 2017.      HISTORY OF PRESENT ILLNESS:  This is a 34-year-old  female,  2,   para 1, EDC 2017 at 36+ weeks estimated gestational age, who was admitted   for a repeat  section after her second dose of steroids per MF.  The   patient was immunized and has a rising anti-D titers.  Please see H and P   dictated by Dr. Manuel for her admission yesterday on 2017.  The patient   had received RhoGAM at 29 weeks and when her titer was drawn, prior to   administering the RhoGAM, the ratio was 1:2.  She did receive RhoGAM, because we   were unaware of that titer and then it has progressively worsened, now being   1:64.  MFM consultation was obtained by Dr. Manuel yesterday on 2017 with   MFM at [facility], who recommended steroids yesterday and then   steroid dose today and immediately following a repeat  section.  He did   not recommend to wait till tomorrow morning.  Because of this recommendation,   the patient is now going to undergo a repeat  section.  Ultrasound   yesterday was reassuring, the only remarkable finding was a thickened placenta   that was measured up to 4.7 cm, but there was no evidence of hydrops or fetal   edema.      PAST MEDICAL HISTORY:  History of anxiety, on Zoloft.      PAST SURGICAL HISTORY:  In , primary  section for failure to   progress.  Only dilated to 2 cm.  In , underwent laparoscopy for hernia   repair.      OB HISTORY:  Full-term  section in May of 2015.  Baby weighed 7 pounds,   2 ounces.  No complications.      GYN HISTORY:  No history of STDs.      ALLERGIES:  NO KNOWN DRUG  ALLERGIES.      MEDICATIONS:  Prenatal vitamins and Zoloft.      SOCIAL HISTORY:  The patient lives with her  and son.  She exercises   regularly.  Has a normal diet.      REVIEW OF SYSTEMS:  Negative.      PHYSICAL EXAMINATION:  VITAL SIGNS:  Height 5 feet 4 inches, weight is   approximately 190, blood pressure normal.  Estimated fetal weight 7 pounds.   Sterile vaginal exam not performed.      IMPRESSION:   1.  Intrauterine pregnancy at 36+ weeks.   2.  Rising anti-D titers concerning per MSM.  Recommended delivery ASAP after       her second dose of steroids.   3.  Fetal well-being reassuring.      PLAN:   1.  Repeat  section.   2.  Neonatology aware.   3.  The risks were reviewed with the patient preoperatively including, but not       limited to, bleeding, infection, injury to internal organs, possibly       resulting in additional surgery.  Infant risks were discussed with the       patient regarding prematurity and all other questions have been answered       preoperatively.  The patient does not wish to have a tubal ligation.            _________________________________   Massimo Kaplan M.D. OB/GYN         CC:   Massimo Kaplan M.D. MP/ELLIS   DD: 2017  DT: 2017   TD: 16:49  TT: 17:31   [id number]         operating room

## 2021-01-05 ENCOUNTER — TRANSCRIPTION ENCOUNTER (OUTPATIENT)
Age: 51
End: 2021-01-05

## 2021-01-05 VITALS
RESPIRATION RATE: 18 BRPM | HEART RATE: 68 BPM | SYSTOLIC BLOOD PRESSURE: 119 MMHG | OXYGEN SATURATION: 98 % | DIASTOLIC BLOOD PRESSURE: 83 MMHG | TEMPERATURE: 99 F

## 2021-01-05 LAB
ANION GAP SERPL CALC-SCNC: 13 MMOL/L — SIGNIFICANT CHANGE UP (ref 7–14)
BUN SERPL-MCNC: 10 MG/DL — SIGNIFICANT CHANGE UP (ref 7–23)
CALCIUM SERPL-MCNC: 8.9 MG/DL — SIGNIFICANT CHANGE UP (ref 8.4–10.5)
CHLORIDE SERPL-SCNC: 100 MMOL/L — SIGNIFICANT CHANGE UP (ref 98–107)
CO2 SERPL-SCNC: 26 MMOL/L — SIGNIFICANT CHANGE UP (ref 22–31)
CREAT SERPL-MCNC: 0.93 MG/DL — SIGNIFICANT CHANGE UP (ref 0.5–1.3)
GLUCOSE SERPL-MCNC: 91 MG/DL — SIGNIFICANT CHANGE UP (ref 70–99)
HCT VFR BLD CALC: 38.9 % — SIGNIFICANT CHANGE UP (ref 34.5–45)
HGB BLD-MCNC: 12.9 G/DL — SIGNIFICANT CHANGE UP (ref 11.5–15.5)
MCHC RBC-ENTMCNC: 31.4 PG — SIGNIFICANT CHANGE UP (ref 27–34)
MCHC RBC-ENTMCNC: 33.2 GM/DL — SIGNIFICANT CHANGE UP (ref 32–36)
MCV RBC AUTO: 94.6 FL — SIGNIFICANT CHANGE UP (ref 80–100)
NRBC # BLD: 0 /100 WBCS — SIGNIFICANT CHANGE UP
NRBC # FLD: 0 K/UL — SIGNIFICANT CHANGE UP
PLATELET # BLD AUTO: 376 K/UL — SIGNIFICANT CHANGE UP (ref 150–400)
POTASSIUM SERPL-MCNC: 3.4 MMOL/L — LOW (ref 3.5–5.3)
POTASSIUM SERPL-SCNC: 3.4 MMOL/L — LOW (ref 3.5–5.3)
RBC # BLD: 4.11 M/UL — SIGNIFICANT CHANGE UP (ref 3.8–5.2)
RBC # FLD: 13.7 % — SIGNIFICANT CHANGE UP (ref 10.3–14.5)
SODIUM SERPL-SCNC: 139 MMOL/L — SIGNIFICANT CHANGE UP (ref 135–145)
WBC # BLD: 11.27 K/UL — HIGH (ref 3.8–10.5)
WBC # FLD AUTO: 11.27 K/UL — HIGH (ref 3.8–10.5)

## 2021-01-05 RX ORDER — SODIUM CHLORIDE 9 MG/ML
1000 INJECTION, SOLUTION INTRAVENOUS
Refills: 0 | Status: DISCONTINUED | OUTPATIENT
Start: 2021-01-05 | End: 2021-01-05

## 2021-01-05 RX ORDER — CEPHALEXIN 500 MG
1 CAPSULE ORAL
Qty: 0 | Refills: 0 | DISCHARGE

## 2021-01-05 RX ORDER — LOSARTAN POTASSIUM 100 MG/1
1 TABLET, FILM COATED ORAL
Qty: 0 | Refills: 0 | DISCHARGE
Start: 2021-01-05

## 2021-01-05 RX ORDER — AMLODIPINE BESYLATE 2.5 MG/1
1 TABLET ORAL
Qty: 0 | Refills: 0 | DISCHARGE

## 2021-01-05 RX ORDER — POTASSIUM CHLORIDE 20 MEQ
20 PACKET (EA) ORAL ONCE
Refills: 0 | Status: COMPLETED | OUTPATIENT
Start: 2021-01-05 | End: 2021-01-05

## 2021-01-05 RX ORDER — ACETAMINOPHEN 500 MG
650 TABLET ORAL ONCE
Refills: 0 | Status: COMPLETED | OUTPATIENT
Start: 2021-01-05 | End: 2021-01-05

## 2021-01-05 RX ORDER — ACETAMINOPHEN 500 MG
2 TABLET ORAL
Qty: 0 | Refills: 0 | DISCHARGE

## 2021-01-05 RX ADMIN — Medication 650 MILLIGRAM(S): at 14:35

## 2021-01-05 RX ADMIN — Medication 400 MILLIGRAM(S): at 02:01

## 2021-01-05 RX ADMIN — Medication 20 MILLIEQUIVALENT(S): at 11:53

## 2021-01-05 RX ADMIN — HEPARIN SODIUM 5000 UNIT(S): 5000 INJECTION INTRAVENOUS; SUBCUTANEOUS at 06:26

## 2021-01-05 RX ADMIN — Medication 400 MILLIGRAM(S): at 07:16

## 2021-01-05 NOTE — DISCHARGE NOTE NURSING/CASE MANAGEMENT/SOCIAL WORK - PATIENT PORTAL LINK FT
You can access the FollowMyHealth Patient Portal offered by [facility] by registering at the following website: [email or web address]. By joining [employer]’s FollowMyHealth portal, you will also be able to view your health information using other applications (apps) compatible with our system.

## 2021-01-05 NOTE — PROGRESS NOTE ADULT - PROBLEM SELECTOR PLAN 2
post op management per urology, pain control,  IS, DVT ppx (SC hep)  doing well, likely d/c home today

## 2021-01-05 NOTE — DISCHARGE NOTE PROVIDER - NSDCCPCAREPLAN_GEN_ALL_CORE_FT
PRINCIPAL DISCHARGE DIAGNOSIS  Diagnosis: UPJ (ureteropelvic junction) obstruction  Assessment and Plan of Treatment: Drink plenty of fluids.  No heavy lifting (greater than 10 pounds) or straining for 4 to 6 weeks.  You may shower, just pat white strips dry, they will fall off in a few weeks.   Do not take norvasc while B/P is normal; may continue cozaar; see your PCP for f/u  Call Dr. Stone's  office  to schedule a follow up appointment.  Call the office if you have fever greater than 101, pain not relieved with pain medication, nausea/vomiting..

## 2021-01-05 NOTE — DISCHARGE NOTE NURSING/CASE MANAGEMENT/SOCIAL WORK - NSDCPNINST_GEN_ALL_CORE
Notify Dr Stone if you experience any increase in pain not relieved with medication, any redness, drainage or swelling around incision, any persistent nausea or vomiting or any fever >100.5.  Drink plenty of fluids.  No heavy lifting or straining.  Use over the counter stool softeners to assist with constipation which can be a side effect of narcotic pain medication.

## 2021-01-05 NOTE — PROGRESS NOTE ADULT - ASSESSMENT
50F HTN, scoliosis, chronic R UPJ obstruction (dx 2015, functioning at 18%).  Recently on 11/14/20 developed severe right flank pain with vomiting, went to the ER, diagnosed with a kidney infection. Treated with Keflex 11/14 2000mg daily and currently is on a lower dosage of Keflex for prophylaxis. 1/4/21 s/p lap R simple nephrectomy.
50F hx HTN, R UPJ obstruction, nonfunctioning R kidney now s/p R laparoscopic simple nephrectomy  -- pt doing well  -- passing flatus, advance diet  -- await TOV  -- await AM labs  -- analgesia/IS/OOB  -- dispo planning
stable s/p R. lap simple neph  Plan:  - AM labs  - clears until flatus  - OOB  - pain control

## 2021-01-05 NOTE — PROGRESS NOTE ADULT - PROBLEM SELECTOR PLAN 1
c/w home meds - Benicar 20 (substitute formulary losartan 50)  amlodipine 5mg qd was just started last week after preop appointment --> BP 110s, would hold amlodipine for now and have pt f/u bp as outpt

## 2021-01-05 NOTE — DISCHARGE NOTE NURSING/CASE MANAGEMENT/SOCIAL WORK - NSDPDISTO_GEN_ALL_CORE
Home stable, tolerating diet, voiding well, oob without difficulty, abd lap sites in place clean dry and intact/Home

## 2021-01-05 NOTE — DISCHARGE NOTE PROVIDER - NSDCMRMEDTOKEN_GEN_ALL_CORE_FT
Benicar 20 mg oral tablet: 1 tab(s) orally once a day  losartan 50 mg oral tablet: 1 tab(s) orally once a day  Tylenol 325 mg oral tablet: 2 tab(s) orally every 6 hours; may alternate with motrin

## 2021-01-05 NOTE — PROGRESS NOTE ADULT - SUBJECTIVE AND OBJECTIVE BOX
Post op check    This  49 yo F is s/p R. lap simple neph  PMH: HTN  Pt is awake and alert  Has no c/o  Afeb 112/71 79 100%RA    Abd- soft; appropriately tender             wounds C&D  Simmons 550  
Subjective  No overnight events. Pt doing well. Ambulated yesterday, (+) flatus. Pain controlled    Objective    Vital signs  T(F): , Max: 98.5 (01-04-21 @ 12:45)  HR: 62 (01-05-21 @ 06:25)  BP: 118/84 (01-05-21 @ 06:25)  SpO2: 99% (01-05-21 @ 06:25)  Wt(kg): --    Output     01-04 @ 07:01  -  01-05 @ 07:00  --------------------------------------------------------  IN: 1000 mL / OUT: 3300 mL / NET: -2300 mL        Gen: NAD  Abd: soft, NT, ND, josue c/d/i  : johns clear yellow    Labs    Imaging
[facility] Division of Hospital Medicine  Lauren Conrad MD  Pager (M-F, 8A-5P):  In-house pager [phone]; Long-range pager [phone]  Other Times:  Please page Hospitalist in Charge -  In-house pager [phone]    Patient is a 50y old  Female who presents with a chief complaint of surgery (04 Jan 2021 10:58)      SUBJECTIVE / OVERNIGHT EVENTS: Doing well, passed gas, eating regular food, pain controlled.  ADDITIONAL REVIEW OF SYSTEMS:    MEDICATIONS  (STANDING):  dextrose 5% + sodium chloride 0.45%. 1000 milliLiter(s) (75 mL/Hr) IV Continuous <Continuous>  heparin   Injectable 5000 Unit(s) SubCutaneous every 8 hours  losartan 50 milliGRAM(s) Oral daily    MEDICATIONS  (PRN):  HYDROmorphone  Injectable 0.5 milliGRAM(s) IV Push every 10 minutes PRN Severe Pain (7 - 10)  oxyCODONE    IR 5 milliGRAM(s) Oral every 4 hours PRN Moderate Pain (4 - 6)  oxyCODONE    IR 10 milliGRAM(s) Oral every 4 hours PRN Severe Pain (7 - 10)      CAPILLARY BLOOD GLUCOSE        I&O's Summary    04 Jan 2021 07:01  -  05 Jan 2021 07:00  --------------------------------------------------------  IN: 1000 mL / OUT: 3300 mL / NET: -2300 mL    05 Jan 2021 07:01  -  05 Jan 2021 10:39  --------------------------------------------------------  IN: 0 mL / OUT: 800 mL / NET: -800 mL        PHYSICAL EXAM:  Vital Signs Last 24 Hrs  T(C): 37.2 (05 Jan 2021 09:45), Max: 37.2 (05 Jan 2021 09:45)  T(F): 98.9 (05 Jan 2021 09:45), Max: 98.9 (05 Jan 2021 09:45)  HR: 68 (05 Jan 2021 09:45) (59 - 92)  BP: 119/83 (05 Jan 2021 09:45) (108/74 - 123/84)  BP(mean): 80 (04 Jan 2021 12:00) (80 - 89)  RR: 18 (05 Jan 2021 09:45) (17 - 24)  SpO2: 98% (05 Jan 2021 09:45) (96% - 100%)    GENERAL: NAD, sitting up in bed  RESPIRATORY: Clear to auscultation bilaterally; No rales, rhonchi, wheezing, or rubs  CARDIOVASCULAR: Regular rate and rhythm; No murmurs, rubs, or gallops  GASTROINTESTINAL: Soft, ND, incisions c/d/i  GENITOURINARY: johns out  EXTREMITIES:  2+ Peripheral Pulses, No clubbing, cyanosis, or edema  NERVOUS SYSTEM:  nonfocal  SKIN: No rashes or lesions  PSYCH: calm, appropriate    LABS:                      RADIOLOGY & ADDITIONAL TESTS:  Results Reviewed:   Imaging Personally Reviewed:  Electrocardiogram Personally Reviewed:    COORDINATION OF CARE:  Care Discussed with Consultants/Other Providers [Y/N]: urology re overall care  Prior or Outpatient Records Reviewed [Y/N]:

## 2021-01-05 NOTE — DISCHARGE NOTE PROVIDER - HOSPITAL COURSE
Pt had RJermaine garcia simple nephrectomy yesterday; did well; labs stable; voiding well; pain is controlled; ambulating; home today.

## 2021-01-05 NOTE — DISCHARGE NOTE PROVIDER - CARE PROVIDER_API CALL
Paolo Stone  UROLOGY  [address] [place] [address], [place] [address]  Phone: [phone]  Fax: [phone]  Follow Up Time:

## 2021-01-07 PROBLEM — N13.5 CROSSING VESSEL AND STRICTURE OF URETER WITHOUT HYDRONEPHROSIS: Chronic | Status: ACTIVE | Noted: 2020-12-22

## 2021-01-07 PROBLEM — N15.9 RENAL TUBULO-INTERSTITIAL DISEASE, UNSPECIFIED: Chronic | Status: ACTIVE | Noted: 2020-12-22

## 2021-01-07 PROBLEM — M41.9 SCOLIOSIS, UNSPECIFIED: Chronic | Status: ACTIVE | Noted: 2020-12-22

## 2021-01-07 PROBLEM — I10 ESSENTIAL (PRIMARY) HYPERTENSION: Chronic | Status: ACTIVE | Noted: 2020-12-22

## 2021-01-14 RX ORDER — NITROFURANTOIN (MONOHYDRATE/MACROCRYSTALS) 25; 75 MG/1; MG/1
100 CAPSULE ORAL
Qty: 30 | Refills: 0 | Status: COMPLETED | COMMUNITY
Start: 2020-12-15 | End: 2021-01-14

## 2021-01-15 LAB — SURGICAL PATHOLOGY STUDY: SIGNIFICANT CHANGE UP

## 2021-01-27 ENCOUNTER — APPOINTMENT (OUTPATIENT)
Dept: UROLOGY | Facility: CLINIC | Age: 51
End: 2021-01-27
Payer: COMMERCIAL

## 2021-01-27 VITALS
DIASTOLIC BLOOD PRESSURE: 100 MMHG | WEIGHT: 135 LBS | HEART RATE: 78 BPM | RESPIRATION RATE: 17 BRPM | BODY MASS INDEX: 24.84 KG/M2 | HEIGHT: 62 IN | TEMPERATURE: 98 F | SYSTOLIC BLOOD PRESSURE: 160 MMHG

## 2021-01-27 PROCEDURE — 99024 POSTOP FOLLOW-UP VISIT: CPT

## 2021-01-27 NOTE — PHYSICAL EXAM
[General Appearance - Well Developed] : well developed [General Appearance - Well Nourished] : well nourished [Abdomen Soft] : soft [Skin Color & Pigmentation] : normal skin color and pigmentation [Skin Turgor] : supple [Heart Rate And Rhythm] : Heart rate and rhythm were normal [] : no respiratory distress [Oriented To Time, Place, And Person] : oriented to person, place, and time [Normal Station and Gait] : the gait and station were normal for the patient's age [No Focal Deficits] : no focal deficits

## 2021-01-27 NOTE — HISTORY OF PRESENT ILLNESS
[None] : no symptoms [FreeTextEntry1] : Lorraine has had right ureter obstruction for many years with recurrent UTI . Right renal function was 18%. She was hospitalized with pyelonephritis . Discussion for simple nephrectomy . Laparoscopic simple nephrectomy .

## 2021-01-27 NOTE — ASSESSMENT
[FreeTextEntry1] : She is doing well . She has no complaints Wounds are all healing without signs of infection . Her GI function has returned and she is doing light "workouts" Reinforced lifting restrictions and hydration while exercising . \par We discussed diet due to a solitary kidney .\par BP was elevated automatic machine and manual . She was taken off one BP med in the hospital . She will take her BP at home . She will call PCP . She has an appointment on Tuesday \par Due to her HTN and lifting restrictions she is not cleared to return to work until the week of Feb 15,2021

## 2021-09-24 ENCOUNTER — OUTPATIENT (OUTPATIENT)
Dept: OUTPATIENT SERVICES | Facility: HOSPITAL | Age: 51
LOS: 1 days | End: 2021-09-24
Payer: COMMERCIAL

## 2021-09-24 ENCOUNTER — APPOINTMENT (OUTPATIENT)
Dept: ULTRASOUND IMAGING | Facility: CLINIC | Age: 51
End: 2021-09-24
Payer: COMMERCIAL

## 2021-09-24 ENCOUNTER — APPOINTMENT (OUTPATIENT)
Dept: MAMMOGRAPHY | Facility: CLINIC | Age: 51
End: 2021-09-24
Payer: COMMERCIAL

## 2021-09-24 DIAGNOSIS — Z87.19 PERSONAL HISTORY OF OTHER DISEASES OF THE DIGESTIVE SYSTEM: Chronic | ICD-10-CM

## 2021-09-24 DIAGNOSIS — Z00.8 ENCOUNTER FOR OTHER GENERAL EXAMINATION: ICD-10-CM

## 2021-09-24 DIAGNOSIS — Z90.89 ACQUIRED ABSENCE OF OTHER ORGANS: Chronic | ICD-10-CM

## 2021-09-24 PROCEDURE — 76641 ULTRASOUND BREAST COMPLETE: CPT | Mod: 26,50

## 2021-09-24 PROCEDURE — 77067 SCR MAMMO BI INCL CAD: CPT | Mod: 26

## 2021-09-24 PROCEDURE — 77063 BREAST TOMOSYNTHESIS BI: CPT

## 2021-09-24 PROCEDURE — 77067 SCR MAMMO BI INCL CAD: CPT

## 2021-09-24 PROCEDURE — 77063 BREAST TOMOSYNTHESIS BI: CPT | Mod: 26

## 2021-09-24 PROCEDURE — 76641 ULTRASOUND BREAST COMPLETE: CPT

## 2021-09-27 ENCOUNTER — OUTPATIENT (OUTPATIENT)
Dept: OUTPATIENT SERVICES | Facility: HOSPITAL | Age: 51
LOS: 1 days | End: 2021-09-27
Payer: COMMERCIAL

## 2021-09-27 ENCOUNTER — RESULT REVIEW (OUTPATIENT)
Age: 51
End: 2021-09-27

## 2021-09-27 ENCOUNTER — APPOINTMENT (OUTPATIENT)
Dept: ULTRASOUND IMAGING | Facility: CLINIC | Age: 51
End: 2021-09-27
Payer: COMMERCIAL

## 2021-09-27 DIAGNOSIS — Z87.19 PERSONAL HISTORY OF OTHER DISEASES OF THE DIGESTIVE SYSTEM: Chronic | ICD-10-CM

## 2021-09-27 DIAGNOSIS — Z00.8 ENCOUNTER FOR OTHER GENERAL EXAMINATION: ICD-10-CM

## 2021-09-27 DIAGNOSIS — Z90.89 ACQUIRED ABSENCE OF OTHER ORGANS: Chronic | ICD-10-CM

## 2021-09-27 PROCEDURE — 88305 TISSUE EXAM BY PATHOLOGIST: CPT | Mod: 26

## 2021-09-27 PROCEDURE — 88305 TISSUE EXAM BY PATHOLOGIST: CPT

## 2021-09-27 PROCEDURE — A4648: CPT

## 2021-09-27 PROCEDURE — 77065 DX MAMMO INCL CAD UNI: CPT

## 2021-09-27 PROCEDURE — 19083 BX BREAST 1ST LESION US IMAG: CPT | Mod: LT

## 2021-09-27 PROCEDURE — 19083 BX BREAST 1ST LESION US IMAG: CPT

## 2021-09-27 PROCEDURE — 77065 DX MAMMO INCL CAD UNI: CPT | Mod: 26,LT

## 2021-09-30 NOTE — H&P PST ADULT - LYMPH NODES
Cast removed today. External bony nasal pyramid not perfectly aligned but patient likely had prior nasal injury. Patient feels that her appearance is been restored to what she remembers prior to her recent injury. Overall she seems satisfied with the result.  Internally her nasal exam is unremarkable with fairly midline position of the septum and a patent nares will airway bilaterally No lymphadedenopathy

## 2022-01-02 ENCOUNTER — TRANSCRIPTION ENCOUNTER (OUTPATIENT)
Age: 52
End: 2022-01-02

## 2022-09-19 NOTE — H&P PST ADULT - NSANTHOSAYNRD_GEN_A_CORE
Left voicemail informing pt of elevated liver tests; liver US ordered and pt advised to call to get scheduled; pt informed to call the office back with questions or concerns.    No. BILLIE screening performed.  STOP BANG Legend: 0-2 = LOW Risk; 3-4 = INTERMEDIATE Risk; 5-8 = HIGH Risk

## 2023-06-01 ENCOUNTER — APPOINTMENT (OUTPATIENT)
Dept: RHEUMATOLOGY | Facility: CLINIC | Age: 53
End: 2023-06-01
Payer: COMMERCIAL

## 2023-06-01 ENCOUNTER — LABORATORY RESULT (OUTPATIENT)
Age: 53
End: 2023-06-01

## 2023-06-01 DIAGNOSIS — N28.9 DISORDER OF KIDNEY AND URETER, UNSPECIFIED: ICD-10-CM

## 2023-06-01 DIAGNOSIS — M18.0 BILATERAL PRIMARY OSTEOARTHRITIS OF FIRST CARPOMETACARPAL JOINTS: ICD-10-CM

## 2023-06-01 DIAGNOSIS — M79.641 PAIN IN RIGHT HAND: ICD-10-CM

## 2023-06-01 DIAGNOSIS — Q62.39 OTHER OBSTRUCTIVE DEFECTS OF RENAL PELVIS AND URETER: ICD-10-CM

## 2023-06-01 DIAGNOSIS — M79.642 PAIN IN RIGHT HAND: ICD-10-CM

## 2023-06-01 PROCEDURE — 36415 COLL VENOUS BLD VENIPUNCTURE: CPT

## 2023-06-01 PROCEDURE — 99204 OFFICE O/P NEW MOD 45 MIN: CPT | Mod: 25

## 2023-06-02 LAB
ALBUMIN SERPL ELPH-MCNC: 4.9 G/DL
ALP BLD-CCNC: 55 U/L
ALT SERPL-CCNC: 22 U/L
ANACR T: NEGATIVE
ANION GAP SERPL CALC-SCNC: 15 MMOL/L
AST SERPL-CCNC: 26 U/L
BILIRUB SERPL-MCNC: 0.5 MG/DL
BUN SERPL-MCNC: 15 MG/DL
CALCIUM SERPL-MCNC: 10.4 MG/DL
CCP AB SER IA-ACNC: <8 UNITS
CHLORIDE SERPL-SCNC: 101 MMOL/L
CO2 SERPL-SCNC: 27 MMOL/L
CREAT SERPL-MCNC: 0.83 MG/DL
CRP SERPL-MCNC: <3 MG/L
EGFR: 84 ML/MIN/1.73M2
ERYTHROCYTE [SEDIMENTATION RATE] IN BLOOD BY WESTERGREN METHOD: 16 MM/HR
GLUCOSE SERPL-MCNC: 99 MG/DL
POTASSIUM SERPL-SCNC: 4.7 MMOL/L
PROT SERPL-MCNC: 7.5 G/DL
RF+CCP IGG SER-IMP: NEGATIVE
RHEUMATOID FACT SER QL: 11 IU/ML
SODIUM SERPL-SCNC: 143 MMOL/L
URATE SERPL-MCNC: 4.9 MG/DL

## 2023-06-05 PROBLEM — N28.9: Status: ACTIVE | Noted: 2021-01-27

## 2023-06-05 LAB
A PHAGOCYTOPH IGG TITR SER IF: NORMAL TITER
B BURGDOR AB SER QL IA: NEGATIVE
B MICROTI IGG TITR SER: NORMAL TITER
E CHAFFEENSIS IGG TITR SER IF: NORMAL TITER

## 2023-06-06 LAB — HLA-B27 QL NAA+PROBE: NORMAL

## 2023-06-07 LAB
ANTI-BETA2 GLYCOPROTEIN 1 IGA CONCENTRATION: 3 U/ML
ANTI-BETA2 GLYCOPROTEIN 1 IGG CONCENTRATION: 2 U/ML
ANTI-BETA2 GLYCOPROTEIN 1 IGG CONCENTRATION: 2 U/ML
ANTI-BETA2 GLYCOPROTEIN 1 IGM CONCENTRATION: 10 U/ML
ANTI-BETA2 GLYCOPROTEIN 1 IGM CONCENTRATION: 10 U/ML
ANTI-C1Q IGG CONCENTRATION: 7 UNITS
ANTI-CARDIOLIPIN IGA CONCENTRATION: 4 APL
ANTI-CARDIOLIPIN IGG CONCENTRATION: 1 GPL
ANTI-CARDIOLIPIN IGG CONCENTRATION: 1 GPL
ANTI-CARDIOLIPIN IGM CONCENTRATION: 3 MPL
ANTI-CARDIOLIPIN IGM CONCENTRATION: 3 MPL
ANTI-CENP IGG CONCENTRATION: 1 U/ML
ANTI-CYCLIC CITRULLINATED PEPTIDE IGG CONCENTRATION: 2 U/ML
ANTI-DOUBLE-STRANDED DNA IGG CONCENTRATION: 33 IU/ML
ANTI-JO-1 IGG CONCENTRATION: 0 U/ML
ANTI-NUCLEAR ANTIBODIES - CYTOPLASMIC PATTERN: NORMAL
ANTI-NUCLEAR ANTIBODIES - PRIMARY NUCLEAR PATTERN: NORMAL
ANTI-NUCLEAR ANTIBODIES - PRIMARY PATTERN TITER: NEGATIVE
ANTI-NUCLEAR ANTIBODIES IGG CONCENTRATION: 44 UNITS
ANTI-PHOSPHATIDYLSERINE/PROTHROMBIN IGG CONCENTRATION: 5 UNITS
ANTI-PHOSPHATIDYLSERINE/PROTHROMBIN IGM CONCENTRATION: 52 UNITS
ANTI-RIBOSOMAL P IGG CONCENTRATION: 1 U/ML
ANTI-RNA POL III IGG CONCENTRATION: <0.7 U/ML
ANTI-RNP70 IGG CONCENTRATION: 2 U/ML
ANTI-RO52 IGG CONCENTRATION: 0 U/ML
ANTI-RO60 IGG CONCENTRATION: <0.4 U/ML
ANTI-SCL-70 IGG CONCENTRATION: 1 U/ML
ANTI-SMITH IGG CONCENTRATION: 15 U/ML
ANTI-SS-B (LA) IGG CONCENTRATION: 1 U/ML
ANTI-THYROGLOBULIN IGG CONCENTRATION: 45 IU/ML
ANTI-THYROID PEROXIDASE IGG CONCENTRATION: 103 IU/ML
ANTI-U1RNP IGG CONCENTRATION: 2 U/ML
AVISE LUPUS RESULT: ABNORMAL
B-LYMPHOCYTE-BOUND C4D (BC4D) LEVEL: NORMAL
ERYTHROCYTE-BOUND C4D (EC4D) LEVEL: 2
RHEUMATOID FACTOR (IGA) CONCENTRATION: 3 IU/ML
RHEUMATOID FACTOR (IGM) CONCENTRATION: 1 IU/ML

## 2023-06-10 LAB — 14-3-3 ETA AG SER IA-MCNC: <0.2 NG/ML

## 2023-12-28 NOTE — ED PROVIDER NOTE - CARDIAC, MLM
Patient: Tiffany Katz    Procedure Summary       Date: 12/28/23 Room / Location: [facility] 08 / SURGERY [facility]    Anesthesia Start: 1246 Anesthesia Stop: 1330    Procedures:       LEFT PARTIAL MASTECTOMY AFTER WIRE LOCALIZATION, LEFT AXILLARY LYMPH BIOPSY AFTER SENTINEL NODE INJECTION (Left: Breast)      BIOPSY, LYMPH NODE, SENTINEL (Left: Axilla) Diagnosis: (INFILTRATING LOBULAR CARCINOMA OF BREAST- LEFT)    Surgeons: Debbie Ordonez M.D. Responsible Provider: Carson Peña M.D.    Anesthesia Type: general ASA Status: 2            Final Anesthesia Type: general  Last vitals  BP   Blood Pressure: 108/61    Temp   36.2 °C (97.2 °F)    Pulse   76   Resp   20    SpO2   93 %      Anesthesia Post Evaluation    Patient location during evaluation: PACU  Patient participation: complete - patient participated  Level of consciousness: awake and alert    Airway patency: patent  Anesthetic complications: no  Cardiovascular status: hemodynamically stable  Respiratory status: acceptable  Hydration status: euvolemic    PONV: none          There were no known notable events for this encounter.     Nurse Pain Score: 3 (NPRS)           Normal rate, regular rhythm.  Heart sounds S1, S2.  No murmurs, rubs or gallops.

## 2024-02-06 ENCOUNTER — TRANSCRIPTION ENCOUNTER (OUTPATIENT)
Age: 54
End: 2024-02-06

## 2025-02-12 NOTE — H&P PST ADULT - PRIMARY CARE PROVIDER
Refill Routing Note   Medication(s) are not appropriate for processing by [facility] for the following reason(s):        Outside of protocol    [facility] action(s):  Route             Appointments  past 12m or future 3m with PCP    Date Provider   Last Visit   1/11/2024 Jesus Haines MD   Next Visit   Visit date not found Jesus Haines MD   ED visits in past 90 days: 0        Note composed:3:55 PM 02/12/2025                Dr. Rodriguez [phone]